# Patient Record
Sex: FEMALE | URBAN - METROPOLITAN AREA
[De-identification: names, ages, dates, MRNs, and addresses within clinical notes are randomized per-mention and may not be internally consistent; named-entity substitution may affect disease eponyms.]

---

## 2019-08-14 ENCOUNTER — APPOINTMENT (RX ONLY)
Dept: URBAN - METROPOLITAN AREA CLINIC 23 | Facility: CLINIC | Age: 69
Setting detail: DERMATOLOGY
End: 2019-08-14

## 2019-08-14 DIAGNOSIS — Z41.9 ENCOUNTER FOR PROCEDURE FOR PURPOSES OTHER THAN REMEDYING HEALTH STATE, UNSPECIFIED: ICD-10-CM

## 2019-08-14 PROCEDURE — ? FILLERS

## 2019-08-14 PROCEDURE — ? RECOMMENDATIONS

## 2019-08-14 PROCEDURE — ? JEUVEAU

## 2019-08-14 NOTE — PROCEDURE: RECOMMENDATIONS
Recommendation Preamble: The following recommendations were made during the visit:
Detail Level: Detailed
Recommendations (Free Text): Discussed Jeuveau for forehead eyes and glabella.  Recommended 2 areas spread \\nRecommended juvederm 1 ml for lips

## 2019-08-14 NOTE — PROCEDURE: FILLERS
Include Cannula Information In Note?: No
Tear Troughs Filler  Volume In Cc: 0
Additional Area 1 Location: lateral mouth, perioral fine lines, vermillion lips, marionette lines
Anesthesia Type: 1% lidocaine without epinephrine
Additional Area 1 Location: lateral mouth, fine lines perioral, marionette lines, lateral cheeks, vermillion lips
Additional Area 2 Location: Nasalabial, Glabellar fine lines- Complimentary
Additional Area 2 Location: Lips, oral commissure, glabellar fine fines
Include Cannula Size?: 25G
Additional Area 3 Location: Glabbellar fine lines, Nasalabial folds, Marionette lines, vermillion lip
Additional Area 1 Location: cheeks
Additional Anesthesia Volume In Cc: 6
Include Cannula Length?: 1.5 inch
Additional Area 4 Location: Perioral
Additional Area 2 Location: cheeks, jawline, oral commissure,
Additional Area 2 Volume In Cc: 1
Consent: Written consent obtained. Risks include but not limited to bruising, beading, irregular texture, ulceration, infection, allergic reaction, scar formation, incomplete augmentation, temporary nature, procedural pain.
Lot #: [id number]
Post-Care Instructions: Patient instructed to apply ice to reduce swelling.
Filler: Juvederm Ultra XC
Lot #: [id number]
Additional Area 4 Location: cheeks, lateral jawline, medial cheeks fine lines
Detail Level: Zone
Expiration Date (Month Year): 09/2021
Expiration Date (Month Year): 08/31/2019
Price (Use Numbers Only, No Special Characters Or $): 0.00
Additional Area 5 Location: Cheeks, vermillion lips, marionette fine lines, glabellar fine lines, lateral mouth
Include Cannula Brand?: DermaSculpt
Lot #: [id number]
Map Statment: See [address] for Complete Details
Expiration Date (Month Year): 2020-03-01

## 2019-08-14 NOTE — PROCEDURE: JEUVEAU
Consent: Written consent obtained. Risks include but not limited to lid/brow ptosis, bruising, swelling, diplopia, temporary effect, incomplete chemical denervation.
Additional Area 3 Units: 0
Lot #: [id number]
Additional Area 1 Location: forehead 3 cm above brow
Periorbital Skin Units: 12
Expiration Date (Month Year): 02/2022
Post-Care Instructions: Patient instructed to not lie down for 4 hours and limit physical activity for 24 hours.
Detail Level: Detailed
Dilution (U/0.1 Cc): 2.5
Glabellar Complex Units: 24

## 2019-08-21 ENCOUNTER — APPOINTMENT (RX ONLY)
Dept: URBAN - METROPOLITAN AREA CLINIC 23 | Facility: CLINIC | Age: 69
Setting detail: DERMATOLOGY
End: 2019-08-21

## 2019-08-21 DIAGNOSIS — Z41.9 ENCOUNTER FOR PROCEDURE FOR PURPOSES OTHER THAN REMEDYING HEALTH STATE, UNSPECIFIED: ICD-10-CM

## 2019-08-21 PROCEDURE — ? JEUVEAU

## 2019-08-21 NOTE — PROCEDURE: JEUVEAU
Forehead Units: 0
Post-Care Instructions: Patient instructed to not lie down for 4 hours and limit physical activity for 24 hours.
Additional Area 1 Location: left and tried forehead
Consent: Written consent obtained. Risks include but not limited to lid/brow ptosis, bruising, swelling, diplopia, temporary effect, incomplete chemical denervation.
Lot #: [id number]
Additional Area 1 Units: 6
Dilution (U/0.1 Cc): 2.5
Expiration Date (Month Year): 02/22
Detail Level: Detailed

## 2021-08-04 ENCOUNTER — APPOINTMENT (RX ONLY)
Dept: URBAN - METROPOLITAN AREA CLINIC 23 | Facility: CLINIC | Age: 71
Setting detail: DERMATOLOGY
End: 2021-08-04

## 2021-08-04 DIAGNOSIS — Z41.9 ENCOUNTER FOR PROCEDURE FOR PURPOSES OTHER THAN REMEDYING HEALTH STATE, UNSPECIFIED: ICD-10-CM

## 2021-08-04 PROCEDURE — ? FILLERS

## 2021-08-04 PROCEDURE — ? DYSPORT

## 2021-08-04 NOTE — PROCEDURE: FILLERS
Include Cannula Information In Note?: No
Tear Troughs Filler  Volume In Cc: 0
Additional Area 1 Location: lateral mouth, perioral fine lines, vermillion lips, marionette lines
Anesthesia Type: 1% lidocaine without epinephrine
Additional Area 1 Location: lateral mouth, fine lines perioral, marionette lines, lateral cheeks, vermillion lips
Additional Area 2 Location: Nasalabial, Glabellar fine lines- Complimentary
Additional Area 2 Location: Lips, oral commissure, glabellar fine fines
Include Cannula Size?: 25G
Additional Area 3 Location: Glabbellar fine lines, Nasalabial folds, Marionette lines, vermillion lip
Additional Area 1 Location: cheeks
Additional Anesthesia Volume In Cc: 6
Include Cannula Length?: 1.5 inch
Additional Area 4 Location: Perioral
Additional Area 2 Location: cheeks, jawline, oral commissure,
Additional Area 2 Volume In Cc: 1
Consent: Written consent obtained. Risks include but not limited to bruising, beading, irregular texture, ulceration, infection, allergic reaction, scar formation, incomplete augmentation, temporary nature, procedural pain.
Lot #: [id number]
Post-Care Instructions: Patient instructed to apply ice to reduce swelling.
Filler: Juvederm Ultra XC
Lot #: [id number]
Additional Area 4 Location: cheeks, lateral jawline, medial cheeks fine lines
Detail Level: Zone
Expiration Date (Month Year): 09/2021
Expiration Date (Month Year): 08/31/2019
Price (Use Numbers Only, No Special Characters Or $): 0.00
Additional Area 5 Location: Cheeks, vermillion lips, marionette fine lines, glabellar fine lines, lateral mouth
Include Cannula Brand?: DermaSculpt
Lot #: [id number]
Map Statment: See [address] for Complete Details
Expiration Date (Month Year): 2020-03-01

## 2021-08-04 NOTE — PROCEDURE: DYSPORT
Show Additional Area 6: Yes
Masseter Units: 0
Show Ucl Units: No
Consent: Written consent obtained. Risks include but not limited to lid/brow ptosis, bruising, swelling, diplopia, temporary effect, incomplete chemical denervation.
Additional Area 1 Location: lateral brows
Additional Area 3 Location: high forehead 3 cm above brows
Dilution (U/ 0.1cc): 1.5
Additional Area 5 Location: glabella
Forehead Units: [address] [facility]
Lot #: [id number]
Price (Use Numbers Only, No Special Characters Or $): 0.00
Detail Level: Simple
Additional Area 2 Location: crows feet
Additional Area 4 Location: 2cm high forehead
Expiration Date (Month Year): 09/30/20
Additional Area 1 Units: 10
Glabellar Complex Units: [address]19 Mg Morales

## 2021-11-09 ENCOUNTER — APPOINTMENT (RX ONLY)
Dept: URBAN - METROPOLITAN AREA CLINIC 23 | Facility: CLINIC | Age: 71
Setting detail: DERMATOLOGY
End: 2021-11-09

## 2021-11-09 DIAGNOSIS — Z41.9 ENCOUNTER FOR PROCEDURE FOR PURPOSES OTHER THAN REMEDYING HEALTH STATE, UNSPECIFIED: ICD-10-CM

## 2021-11-09 PROCEDURE — ? DYSPORT

## 2021-11-09 PROCEDURE — ? ADDITIONAL NOTES

## 2021-11-09 PROCEDURE — ? FILLERS

## 2021-11-09 NOTE — PROCEDURE: FILLERS
Additional Area 4 Volume In Cc: 0
Additional Area 4 Location: Perioral
Use Map Statement For Sites (Optional): No
Detail Level: Zone
Additional Area 5 Location: Cheeks, vermillion lips, marionette fine lines, glabellar fine lines, lateral mouth
Map Statment: See [address] for Complete Details
Lot #: [id number]
Expiration Date (Month Year): 2022-08-30
Lot #: [id number]
Anesthesia Type: 1% lidocaine without epinephrine
Expiration Date (Month Year): 09/2021
Lot #: [id number]
Consent: Written consent obtained. Risks include but not limited to bruising, beading, irregular texture, ulceration, infection, allergic reaction, scar formation, incomplete augmentation, temporary nature, procedural pain.
Expiration Date (Month Year): 08/31/2019
Post-Care Instructions: Patient instructed to apply ice to reduce swelling.
Include Cannula Brand?: DermaSculpt
Include Cannula Size?: 25G
Additional Anesthesia Volume In Cc: 6
Additional Area 1 Location: lateral cheeks, lateral mouth, lateral jawline, perioral fine lines, vermillion lips
Additional Area 1 Volume In Cc: 1
Include Cannula Length?: 1.5 inch
Additional Area 1 Location: lateral mouth, perioral fine lines, vermillion lips, marionette lines
Additional Area 2 Location: cheeks, jawline, oral commissure
Additional Area 1 Location: lateral mouth, fine lines perioral, marionette lines, lateral cheeks, vermillion lips
Filler: Juvederm Ultra XC
Additional Area 2 Location: Lips, oral commissure, glabellar fine fines
Additional Area 4 Location: lateral jawline, lateral mouth, glabella lines,
Additional Area 3 Location: Glabbellar fine lines, Nasalabial folds, Marionette lines, vermillion lip
Additional Area 2 Location: Nasalabial, Glabellar fine lines- Complimentary
Price (Use Numbers Only, No Special Characters Or $): 0.00

## 2021-11-09 NOTE — PROCEDURE: ADDITIONAL NOTES
Detail Level: Detailed
Render Risk Assessment In Note?: no
Additional Notes: Hyaluronidase injection (4 units) left marionette line.  Lot # [id number] exp: 04/24

## 2021-11-09 NOTE — PROCEDURE: DYSPORT
Length Of Topical Anesthesia Application (Optional): 15 minutes
Show Lcl Units: No
Mentalis Units: 0
Show Additional Area 5: Yes
Additional Area 1 Units: [address]
Detail Level: Simple
Expiration Date (Month Year): 07/31/22
Additional Area 4 Location: 2cm high forehead
Price (Use Numbers Only, No Special Characters Or $): 0.00
Lot #: [id number]
Additional Area 2 Location: high forehead 3 cm above brows
Dilution (U/ 0.1cc): 1.5
Additional Area 3 Location: lateral eyes
Topical Anesthesia?: 20% benzocaine, 8% lidocaine, 4% tetracaine
Additional Area 2 Units: 10
Additional Area 5 Location: glabella
Consent: Written consent obtained. Risks include but not limited to lid/brow ptosis, bruising, swelling, diplopia, temporary effect, incomplete chemical denervation.

## 2022-10-18 ENCOUNTER — APPOINTMENT (RX ONLY)
Dept: URBAN - METROPOLITAN AREA CLINIC 23 | Facility: CLINIC | Age: 72
Setting detail: DERMATOLOGY
End: 2022-10-18

## 2022-10-18 DIAGNOSIS — Z41.9 ENCOUNTER FOR PROCEDURE FOR PURPOSES OTHER THAN REMEDYING HEALTH STATE, UNSPECIFIED: ICD-10-CM

## 2022-10-18 PROCEDURE — ? DYSPORT

## 2022-10-18 PROCEDURE — ? FILLERS

## 2022-10-18 NOTE — PROCEDURE: DYSPORT
Consent: Written consent obtained. Risks include but not limited to lid/brow ptosis, bruising, swelling, diplopia, temporary effect, incomplete chemical denervation.
L Brow Units: 0
Show Additional Area 4: Yes
Lot #: [id number]
Show Inventory Tab: Show
Show Ucl Units: No
Additional Area 2 Location: crows feet
Additional Area 3 Location: glabella
Dilution (U/ 0.1cc): 1.5
Additional Area 4 Location: 2cm  above brow
Additional Area 4 Units: 10
Expiration Date (Month Year): 2022-08
Price (Use Numbers Only, No Special Characters Or $): 0.00
Glabellar Complex Units: [address]
Detail Level: Simple
Additional Area 1 Location: neck bands

## 2022-10-18 NOTE — PROCEDURE: FILLERS
Vermilion Lips Filler Volume In Cc: 0
Additional Anesthesia Volume In Cc: 6
Include Cannula Information In Note?: No
Inventory Information: This plan will send filler information to inventory based on the fillers you select. Multiple fillers can be sent but you must ensure you select the appropriate fillers in the inventory tab.
Detail Level: Detailed
Additional Area 1 Location: lateral cheeks, lateral jawline, lateral mouth, marionette lines.
Number Of Syringes (Required For Inventory): 1
Show Inventory Tab: Show
Filler: Juvederm Voluma XC
Consent: Written consent obtained. Risks include but not limited to bruising, beading, irregular texture, ulceration, infection, allergic reaction, scar formation, incomplete augmentation, temporary nature, procedural pain.
Additional Area 1 Location: lateral mouth, perioral fine lines, marionette lines.
Map Statment: See [address] for Complete Details
Post-Care Instructions: Patient instructed to apply ice to reduce swelling.
Anesthesia Type: 1% lidocaine with epinephrine
Anesthesia Volume In Cc: 0.5